# Patient Record
Sex: FEMALE
[De-identification: names, ages, dates, MRNs, and addresses within clinical notes are randomized per-mention and may not be internally consistent; named-entity substitution may affect disease eponyms.]

---

## 2018-05-01 ENCOUNTER — HOSPITAL ENCOUNTER (INPATIENT)
Dept: HOSPITAL 92 - SJJU | Age: 58
LOS: 9 days | Discharge: HOME | DRG: 470 | End: 2018-05-10
Attending: ORTHOPAEDIC SURGERY | Admitting: ORTHOPAEDIC SURGERY
Payer: COMMERCIAL

## 2018-05-01 ENCOUNTER — HOSPITAL ENCOUNTER (OUTPATIENT)
Dept: HOSPITAL 92 - LABBT | Age: 58
End: 2018-05-01
Attending: ORTHOPAEDIC SURGERY
Payer: COMMERCIAL

## 2018-05-01 VITALS — BODY MASS INDEX: 37.2 KG/M2

## 2018-05-01 DIAGNOSIS — E78.5: ICD-10-CM

## 2018-05-01 DIAGNOSIS — E66.9: ICD-10-CM

## 2018-05-01 DIAGNOSIS — M16.11: Primary | ICD-10-CM

## 2018-05-01 DIAGNOSIS — D64.9: ICD-10-CM

## 2018-05-01 DIAGNOSIS — M19.90: ICD-10-CM

## 2018-05-01 DIAGNOSIS — M17.11: ICD-10-CM

## 2018-05-01 DIAGNOSIS — Z01.818: Primary | ICD-10-CM

## 2018-05-01 LAB
ANION GAP SERPL CALC-SCNC: 13 MMOL/L (ref 10–20)
BUN SERPL-MCNC: 13 MG/DL (ref 9.8–20.1)
CALCIUM SERPL-MCNC: 9.5 MG/DL (ref 7.8–10.44)
CHLORIDE SERPL-SCNC: 107 MMOL/L (ref 98–107)
CO2 SERPL-SCNC: 26 MMOL/L (ref 22–29)
CREAT CL PREDICTED SERPL C-G-VRATE: 0 ML/MIN (ref 70–130)
CRYSTAL-AUWI FLAG: 0.1 (ref 0–15)
GLUCOSE SERPL-MCNC: 136 MG/DL (ref 70–105)
HEV IGM SER QL: 2 (ref 0–7.99)
HGB BLD-MCNC: 12.3 G/DL (ref 12–16)
HYALINE CASTS #/AREA URNS LPF: (no result) LPF
INR PPP: 1
MCH RBC QN AUTO: 29.1 PG (ref 27–31)
MCV RBC AUTO: 84.7 FL (ref 81–99)
PATHC CAST-AUWI FLAG: 0 (ref 0–2.49)
PLATELET # BLD AUTO: 207 THOU/UL (ref 130–400)
POTASSIUM SERPL-SCNC: 3.9 MMOL/L (ref 3.5–5.1)
PROTHROMBIN TIME: 13.5 SEC (ref 12–14.7)
RBC # BLD AUTO: 4.22 MILL/UL (ref 4.2–5.4)
RBC UR QL AUTO: (no result) HPF (ref 0–3)
SODIUM SERPL-SCNC: 142 MMOL/L (ref 136–145)
SP GR UR STRIP: 1.01 (ref 1–1.04)
SPERM-AUWI FLAG: 0 (ref 0–9.9)
WBC # BLD AUTO: 7.4 THOU/UL (ref 4.8–10.8)
WBC UR QL AUTO: (no result) HPF (ref 0–3)
YEAST-AUWI FLAG: 0 (ref 0–25)

## 2018-05-01 PROCEDURE — 80048 BASIC METABOLIC PNL TOTAL CA: CPT

## 2018-05-01 PROCEDURE — 87081 CULTURE SCREEN ONLY: CPT

## 2018-05-01 PROCEDURE — 36415 COLL VENOUS BLD VENIPUNCTURE: CPT

## 2018-05-01 PROCEDURE — 93010 ELECTROCARDIOGRAM REPORT: CPT

## 2018-05-01 PROCEDURE — 86900 BLOOD TYPING SEROLOGIC ABO: CPT

## 2018-05-01 PROCEDURE — 81001 URINALYSIS AUTO W/SCOPE: CPT

## 2018-05-01 PROCEDURE — 85027 COMPLETE CBC AUTOMATED: CPT

## 2018-05-01 PROCEDURE — 86901 BLOOD TYPING SEROLOGIC RH(D): CPT

## 2018-05-01 PROCEDURE — 85610 PROTHROMBIN TIME: CPT

## 2018-05-01 PROCEDURE — 93005 ELECTROCARDIOGRAM TRACING: CPT

## 2018-05-01 PROCEDURE — 86850 RBC ANTIBODY SCREEN: CPT

## 2018-05-01 NOTE — EKG
Test Reason : 

Blood Pressure : ***/*** mmHG

Vent. Rate : 074 BPM     Atrial Rate : 074 BPM

   P-R Int : 190 ms          QRS Dur : 086 ms

    QT Int : 392 ms       P-R-T Axes : 064 059 066 degrees

   QTc Int : 435 ms

 

Normal sinus rhythm

Normal ECG

No previous ECGs available

Confirmed by DR. MUSA PATTON ([id number]) on 5/1/2018 4:46:18 PM

 

Referred By:  EULALIO           Confirmed By:DR. MUSA PATTON

## 2018-05-08 PROCEDURE — 0SR904Z REPLACEMENT OF RIGHT HIP JOINT WITH CERAMIC ON POLYETHYLENE SYNTHETIC SUBSTITUTE, OPEN APPROACH: ICD-10-PCS | Performed by: ORTHOPAEDIC SURGERY

## 2018-05-08 RX ADMIN — Medication SCH GM: at 15:41

## 2018-05-08 RX ADMIN — Medication SCH GM: at 23:04

## 2018-05-08 RX ADMIN — DOCUSATE SODIUM 50 MG AND SENNOSIDES 8.6 MG SCH TAB: 8.6; 5 TABLET, FILM COATED ORAL at 20:52

## 2018-05-08 NOTE — CON
DATE OF CONSULTATION:  05/08/2018

 

PRIMARY CARE PHYSICIAN:  Dr. Valera.

 

PRIMARY ATTENDING:  Dr. Migue Kim.

 

REASON FOR ADMISSION:  Elective admission for right hip replacement.

 

HISTORY OF PRESENT ILLNESS:  A 57-year-old female who has underlying history of 
hypertension as well as osteoarthritis.  She is having right hip pain for about 
a year.  Her pain is getting worse with physical activity.  Her pain gets 
better with rest.  She tried all NSAIDs and different type of pain medication 
as well as exercise without any significant improvement.  Her right hip was 
more worse and that was affecting her quality of life and that is why finally 
patient agreed to go for right hip replacement.  Dr. Camarena admitted her for 
right hip replacement.

 

She does not have any chest pain, palpitation, shortness of breath.  She denies 
any constipation or diarrhea.  She denies any UTI symptoms.  She denies any 
nausea, vomiting, diarrhea.

 

Patient is Persian speaking only, so history obtained with help of her daughter 
who is present at bedside.

 

PAST MEDICAL HISTORY:  Hypertension.

 

PAST SURGICAL HISTORY:  Cholecystectomy and status post right hip replacement.

 

PAST PSYCHIATRIC HISTORY:  Reviewed and negative.

 

ALLERGIES:  No known drug allergy.

 

FAMILY HISTORY:  No strong family history of premature coronary artery disease, 
stroke or cancer.

 

CURRENT HOME MEDICATIONS:  Celecoxib 200 mg p.o. daily, Toradol with tramadol 10
/25 one tablet q.6 hourly p.r.n., losartan 50 mg p.o. daily, Lyrica 75 mg p.o. 
at bedtime, vitamin B complex 1 tablet p.o. daily.

 

REVIEW OF SYSTEMS:  The following complete review of systems was negative, 
unless otherwise mentioned in the HPI or below:

Constitutional:  Weight loss or gain, ability to conduct usual activities.

Skin:  Rash, itching.

Eyes:  Double vision, pain.

ENT/Mouth:  Nose bleeding, neck stiffness, pain, tenderness.

Cardiovascular:  Palpitations, dyspnea on exertion, orthopnea.

Respiratory:  Shortness of breath, wheezing, cough, hemoptysis, fever or night 
sweats.

Gastrointestinal:  Poor appetite, abdominal pain, heartburn, nausea, vomiting, 
constipation, or diarrhea.

Genitourinary:  Urgency, frequency, dysuria, nocturia.

Musculoskeletal:  Pain, swelling.

Neurologic/Psychiatric:  Anxiety, depression.

Allergy/Immunologic:  Skin rash, bleeding tendency.

 

Please see my HPI for pertinent positive and negative.  All other review of 
systems reviewed and negative except as mentioned in the HPI.

 

SOCIAL HISTORY:  Patient is  and lives at home with family.  No history 
of tobacco, alcohol or illicit drug abuse.

 

HOSPITAL COURSE:  Reviewed.

 

PHYSICAL EXAMINATION:

VITAL SIGNS:  Currently, blood pressure 120/70, pulse 72, respiratory rate 18, 
weight 210 pounds, saturation 99% on room air.

GENERAL:  The patient is currently alert, awake, in no obvious acute distress.

HEAD:  Normocephalic, atraumatic.

EYES:  Pupils round, reactive to light.  Extraocular muscle intact.

ENT:  Oropharynx within normal limits.  Moist mucous membranes.  No oral lesion
, no pharyngeal erythema, no exudate.

NECK:  Supple, no JVD, no thyromegaly, no carotid bruit, no jugular venous 
distention.

LUNGS:  Clear to auscultation without any rhonchi or rales.

CARDIAC:  S1 and S2 regular.  No murmur, no gallop, no rub.

ABDOMEN:  Soft, bowel sounds present, nontender, nondistended.  No organomegaly
, no mass.  Obesity present.  No suprapubic tenderness.

BACK:  Examination unremarkable.

EXTREMITIES:  Upper extremity passive movement of all joints are normal.  Lower 
extremity, right hip site surgical dressing noted.  Epidural in place.  SCD in 
place.  No edema.  Good peripheral pulsation, no calf tenderness.  Good distal 
pulsation.

SKIN:  No skin rash.

HEMATOLOGICAL SYSTEM:  No lymphadenopathy.

NEUROLOGIC:  Nonfocal examination.  Patient is moving all 4 limbs.  Sensation 
intact.  Reflexes symmetrical.  Plantar bilateral flexor.

PSYCHIATRIC:  Normal affect.

 

SIGNIFICANT LABORATORY DATA AND IMAGING DATA:  CBC:  WBC 7.4, hemoglobin 12.3, 
platelet 207.  INR 1.0.  BMP:  Sodium 142, potassium 3.9, chloride 107, carbon 
dioxide 26, anion gap 13, BUN 13, creatinine 0.65, glucose 136.  Hemoglobin A1c 
6.2, calcium 9.5.  LFT:  AST 15, ALT 17, alkaline phosphatase 61, albumin 4.0, 
.  Urinalysis; leukocyte esterase trace.  Hip x-ray noted.

 

ASSESSMENT AND PLAN:

1.  Status post right hip replacement.  The patient has advanced osteoarthritis 
and failed conservative therapy required right hip replacement.  Currently, 
patient had surgery done without any complications.  She has epidural in placed 
and pain is controlled.  The patient will get aspirin 81 mg p.o. b.i.d. for 
deep venous thrombosis prophylaxis.  As per protocol postoperatively, we will 
continue ferrous gluconate 324 mg p.o. b.i.d.  The patient will be given stool 
softener to prevent constipation.  The patient will be given pain medication as 
needed basis for pain control.  She will have physical therapy and occupational 
therapy as per [facility] protocol treatment.

2.  Hypertension.  We will continue losartan 50 mg p.o. daily.  If blood 
pressure is less than 100, then will hold on antihypertensive medication.

3.  History of dyslipidemia.  Based on lipid profile in 2015, patient has 
elevated LDL.  The patient is instructed to follow up with primary care 
physician and repeat outpatient fasting lipid profile and considering statin 
therapy will be required based on lipid profile testing.  We will defer testing 
to be done after discharge.

4.  Prediabetes, based on 2015, patient has hemoglobin A1c of 6.2.  The patient 
will need another testing after discharge.  Necessary patient education given 
about diet and exercise.

5.  Obesity with body mass index of 37.  Weight loss education given.  Healthy 
lifestyle measures discussed with the patient.

6.  Deep venous thrombosis prophylaxis.  Patient is already on aspirin therapy 
for deep venous thrombosis prophylaxis as per protocol.

7.  Gastrointestinal prophylaxis, Pepcid 20 mg p.o. b.i.d.

8.  Code status:  The patient is FULL CODE.  Patient's  is surrogate 
decision maker.

 

Disposition plan based on clinical course.  Patient will stay in hospital more 
than 2 midnights.

 

Thank you for the consult.  We will follow up with you while in hospital.  Plan 
of care discussed with the patient's family member at bedside.

 

GEOVANY

## 2018-05-08 NOTE — RAD
AP VIEW PELVIS

TWO VIEWS RIGHT HIP:

 

DATE: 5/8/18.

 

HISTORY:  

Post right total hip arthroplasty.

 

FINDINGS: 

There are postsurgical changes related to placement of right total hip prosthesis.  No hardware compl
ication is seen.  No fracture or dislocation is identified.  No other osseous abnormality.  Phlebolit
hs overlie the pelvis.  Subcutaneous emphysema is seen at the right lateral hip related to recent pos
tsurgical change.

 

IMPRESSION: 

Right total hip prosthesis.  No other acute osseous abnormality is seen.

 

POS: SUDHAKAR

## 2018-05-08 NOTE — OP
DATE OF PROCEDURE:  05/08/2018

 

PREOPERATIVE DIAGNOSIS:  Right hip osteoarthritis.

 

POSTOPERATIVE DIAGNOSIS:  Right hip osteoarthritis.

 

PROCEDURE PERFORMED:  Right total hip arthroplasty.

 

STAFF:  Julio Camarena M.D.

 

ASSISTANT:  Campos Mathias PA-C

 

ANESTHESIA:  Kamlesh.  The patient received a general intubation with an epidural.

 

ESTIMATED BLOOD LOSS:  150 mL.

 

TOURNIQUET TIME:  None.

 

IMPLANTS:  A Trident 15 mm cluster shell Trident 10 degree poly insert, 36 mm 
Accolade 130 degree size 2 neck stem, 36 Biolox Delta minus 2-1/2 femoral head 
ceramic femoral head.

 

ANTIBIOTICS:  Given with Ancef 2 grams, vancomycin 1.5 grams.

 

COMPLICATIONS:  None.

 

HISTORY OF PRESENT ILLNESS:  Amandeep Burgess is a 57-year-old female who 
presented to me with right hip pain as well as some radiating pain.  Pain is 
increased with ROM leading to groin and deeper hip pain.  She has pain when she 
rides very long in seated position, 8 out of 10.  Denies numbness or tingling.  
The patient does have radiating pain at posterior calf.  Exam of the right hip 
showed groin pain with internal rotation of her hip as well as neurovascularly 
intact.  She was straight leg raise equivocal. MRI of her hip show degenerative 
changes as well as x-rays showed osteophyte formation, migration of the head 
and joint space loss without acute fracture.  MRI of lumbar spine also showed 
degenerative changes at L3-L4 as well as L4-L5.  I discussed with the patient 
that a portion of his pain was coming from a right hip arthritis and a portion 
was coming from her back.  I discussed the risks and benefits of surgery to 
include pain, scar, bleeding, infection, damage to vital structures, decreased 
range of motion or strength, failure of procedure, continued pain despite 
surgical intervention, loss of life or limb.  The patient understands the risks 
and benefits and elected to proceed.

 

PROCEDURE IN DETAIL:  Time out was performed designating the patient's right 
lower extremity as the operative site based on sight, consents and markings.  
After timeout, the patient's right lower extremity was prepped and draped in 
sterile fashion.  The patient was placed in lateral decubitus position with all 
bony prominences well padded, hip position, lateral incision down through skin, 
down the large amount of subcutaneous fat to the IT band.  IT band was split.  
Gluteus medius and minimus were taken off.  The capsule was evaluated, excised 
down to the acetabulum.  We dislocated the hip, cut the femoral neck, removed 
the head was about 46 mm diameter.  We then excised all the labrum.  We then 
placed our acetabular retractors in place.  We reamed sequentially up from 44 
up to 50.  We placed a final 50 mm cup into position.  We had good firm fixation
, 5 to 6 mm  uncovered superior as well as posterior.  I liked position of the 
cup.  We then washed, placed our 10 degree poly insert in position, held in 
place, had firm stable fixation with our cup.  We then moved back to the patient
's femoral neck.  We broached.  We used a cookie cutter opening canal reamer.  
We then placed a broach and broached to a size 2.  We reduced it to size 2 
standard, felt the patient might be a little long, put a final trial and 
therefore elected for a size 2 with -2.5 ceramic head.  We reduced and had good 
stability.  Lengths seemed equal on the table.  We reduced into place, washed, 
and closed the gluteus minimus, gluteus medius with #2 Vicryl, #2 Quill, 0 Quill
, 2-0 Quill, and glue.

 

The patient will be admitted back to [facility].  We will follow in 
house.

 

GEOVANY

## 2018-05-09 LAB
HGB BLD-MCNC: 10.1 G/DL (ref 12–16)
MCH RBC QN AUTO: 28.9 PG (ref 27–31)
MCV RBC AUTO: 85.5 FL (ref 81–99)
PLATELET # BLD AUTO: 152 THOU/UL (ref 130–400)
RBC # BLD AUTO: 3.48 MILL/UL (ref 4.2–5.4)
WBC # BLD AUTO: 7.7 THOU/UL (ref 4.8–10.8)

## 2018-05-09 RX ADMIN — MULTIPLE VITAMINS W/ MINERALS TAB SCH TAB: TAB at 08:10

## 2018-05-09 RX ADMIN — DOCUSATE SODIUM 50 MG AND SENNOSIDES 8.6 MG SCH TAB: 8.6; 5 TABLET, FILM COATED ORAL at 08:07

## 2018-05-09 RX ADMIN — Medication SCH TAB: at 08:11

## 2018-05-09 RX ADMIN — DOCUSATE SODIUM 50 MG AND SENNOSIDES 8.6 MG SCH TAB: 8.6; 5 TABLET, FILM COATED ORAL at 20:09

## 2018-05-09 NOTE — PDOC.PN
- Subjective


Encounter Start Date: 05/09/18


Encounter Start Time: 08:30





Patient seen and examined for medical management. No new complaints. No 

overnight events





overall doing well, her pain is controlled





- Objective


Resuscitation Status: 


 











Resuscitation Status           FULL:Full Resuscitation














MAR Reviewed: Yes


Vital Signs & Weight: 


 Vital Signs (12 hours)











  Temp Pulse Resp BP BP Pulse Ox


 


 05/09/18 08:26  99.1 F  93  16   118/68  95


 


 05/09/18 04:38  100.1 F H  95  17  110/66   98


 


 05/08/18 23:04  99.5 F  87  16  113/68   99








 Weight











Weight                         210 lb














I&O: 


 











 05/08/18 05/09/18 05/10/18





 06:59 06:59 06:59


 


Intake Total  3625 


 


Output Total  2100 


 


Balance  1525 











Result Diagrams: 


 05/09/18 05:33








Phys Exam





- Physical Examination


Constitutional: NAD


HEENT: PERRLA, moist MMs, sclera anicteric


Neck: no nodes, no JVD, supple, full ROM


Respiratory: no wheezing, no rales, no rhonchi, clear to auscultation bilateral


Cardiovascular: RRR, no significant murmur, no rub


Gastrointestinal: soft, non-tender, no distention, positive bowel sounds


obesity+


Musculoskeletal: no edema, pulses present


epidrual in place, surgical site with dressing


Neurological: non-focal, normal sensation, moves all 4 limbs


Lymphatic: no nodes


Psychiatric: normal affect, A&O x 3


Skin: no rash, normal turgor





Dx/Plan


(1) Status post total hip replacement, right


Code(s): Z96.641 - PRESENCE OF RIGHT ARTIFICIAL HIP JOINT   Status: Acute   





(2) Anemia, normocytic normochromic


Code(s): D64.9 - ANEMIA, UNSPECIFIED   Status: Chronic   





(3) Dyslipidemia


Code(s): E78.5 - HYPERLIPIDEMIA, UNSPECIFIED   Status: Chronic   





(4) Hypertension


Code(s): I10 - ESSENTIAL (PRIMARY) HYPERTENSION   Status: Chronic   





(5) Obesity (BMI 30-39.9)


Code(s): E66.9 - OBESITY, UNSPECIFIED   Status: Chronic   





(6) Osteoarthritis


Code(s): M19.90 - UNSPECIFIED OSTEOARTHRITIS, UNSPECIFIED SITE   Status: 

Chronic   





- Plan


cont current plan of care, plan discussed w/ family, PT/OT, DVT proph w/SCDs





* pt is on aspirin for DVT prophylaxis as per [facility] protocol


* continue PT/OT as per [facility] protocol


* pain control with pain meds  as below


* epidural as per anesthesia


* medication reviewed as below


* symptomatic treatment


* continue pepcid for GI prophylaxis.


* hold blood pressure meds for SBP <120 today


* will monitor for fever today, likely reactive fever after surgery











Review of Systems





- Review of Systems


Constitutional: fever.  negative: chills, sweats, weakness, malaise, other


Eyes: negative: Pain, Vision Change, Conjunctivae Inflammation, Eyelid 

Inflammation, Redness, Other


ENT: negative: Ear Pain, Ear Discharge, Nose Pain, Nose Discharge, Nose 

Congestion, Mouth Pain, Mouth Swelling, Throat Pain, Throat Swelling, Other


Respiratory: negative: Cough, Dry, Shortness of Breath, Hemoptysis, SOB with 

Excertion, Pleuritic Pain, Sputum, Wheezing


Cardiovascular: negative: chest pain, palpitations, orthopnea, paroxysmal 

nocturnal dyspnea, edema, light headedness, other


Gastrointestinal: negative: Nausea, Vomiting, Abdominal Pain, Diarrhea, 

Constipation, Melena, Hematochezia, Other


Genitourinary: negative: Dysuria, Frequency, Incontinence, Hematuria, Retention

, Other


Musculoskeletal: negative: Neck Pain, Shoulder Pain, Arm Pain, Back Pain, Hand 

Pain, Leg Pain, Foot Pain, Other


Skin: negative: Rash, Lesions, Moody, Bruising, Other


Neurological: negative: Weakness, Numbness, Incoordination, Change in Speech, 

Confusion, Seizures, Other





- Medications/Allergies


Allergies/Adverse Reactions: 


 Allergies











Allergy/AdvReac Type Severity Reaction Status Date / Time


 


No Known Allergies Allergy   Unverified 05/01/18 14:27











Medications: 


 Current Medications





Acetaminophen (Tylenol)  650 mg PO Q4H PRN


   PRN Reason: HA/ T > 101F; Mild Pain (1-3)


   Last Admin: 05/09/18 03:14 Dose:  650 mg


Hydrocodone Bitart/Acetaminophen (Norco 5/325)  1 tab PO Q4H PRN


   PRN Reason: Mild  Pain 0-3


Hydrocodone Bitart/Acetaminophen (Norco 5/325)  2 tab PO Q4H PRN


   PRN Reason: For Moderate  Pain 4-6


Hydrocodone Bitart/Acetaminophen (Norco 10/325)  1 tab PO Q4H PRN


   PRN Reason: Moderate Pain (4-6)


Hydrocodone Bitart/Acetaminophen (Norco 10/325)  2 tab PO Q4H PRN


   PRN Reason: Severe Pain (7-10)


Al Hydroxide/Mg Hydroxide (Maalox)  15 ml PO Q4H PRN


   PRN Reason: Heartburn  or Indigestion


Artificial Tears (Tears Naturale)  0 drop EA EYE PRN PRN


   PRN Reason: Dry Eyes


Aspirin (Aspirin Chewable)  81 mg PO BID [facility]


   Last Admin: 05/09/18 08:10 Dose:  81 mg


Celecoxib (Celebrex)  200 mg PO DAILY [facility]


   Last Admin: 05/09/18 08:10 Dose:  200 mg


Diphenhydramine HCl (Benadryl)  25 mg IM Q3H PRN


   PRN Reason: Itching


Diphenhydramine HCl (Benadryl)  25 mg IVP Q3H PRN


   PRN Reason: Itching


Diphenhydramine HCl (Benadryl)  25 mg PO Q6H PRN


   PRN Reason: Itching


Emollient Cream (Hydrocerin Cream)  0 gm TOP PRN PRN


   PRN Reason: Itching


Famotidine (Pepcid)  20 mg PO BID [facility]


   Last Admin: 05/09/18 08:10 Dose:  20 mg


Fentanyl (Sublimaze)  50 mcg SLOW IVP Q30MIN PRN


   PRN Reason: Moderate Pain (4-6)


Fentanyl (Sublimaze)  100 mcg SLOW IVP Q1H PRN


   PRN Reason: Severe Pain (7-10)


Ferrous Gluconate (Fergon)  324 mg PO BID [facility]


   Last Admin: 05/09/18 08:09 Dose:  324 mg


Guaifenesin (Robitussin Sf)  200 mg PO Q4H PRN


   PRN Reason: Cough


Hydralazine HCl (Apresoline)  10 mg SLOW IVP Q4H PRN


   PRN Reason: Systolic BP > 180


Fentanyl Citrate 1,250 mcg/Bupivacaine HCl 25 ml/ Sodium Chloride  250 mls @ 0 

mls/hr EPIDURAL INF [facility]


   PRN Reason: As Directed


Dextrose/Sodium Chloride (D5 1/2 Ns)  1,000 mls @ 100 mls/hr IV .Q10H [facility]


   Last Admin: 05/09/18 06:52 Dose:  Not Given


Iron/Minerals/Multivitamins (Theragran M)  1 tab PO DAILY [facility]


   Last Admin: 05/09/18 08:10 Dose:  1 tab


Ketorolac Tromethamine (Toradol)  30 mg IVP Q6H PRN


   PRN Reason: Moderate Pain (4-6)


   Stop: 05/11/18 07:16


Labetalol HCl (Normodyne)  20 mg SLOW IVP Q4H PRN


   PRN Reason: Systolic BP > 180


Loperamide HCl (Imodium)  2 mg PO PRN PRN


   PRN Reason: Diarrhea/Loose Stools


Losartan Potassium (Cozaar)  50 mg PO DAILY [facility]


Magnesium Hydroxide (Milk Of Magnesium)  30 ml PO DAILYPRN PRN


   PRN Reason: Constipation


Mineral Oil/White Petrolatum (Eucerin Cream)  0 gm TOP BIDPRN PRN


   PRN Reason: Dry Skin


Miscellaneous Information (Communication Order-Pharmacy)  1 each FS ASDIR [facility]


Multivitamins/Zinc (Stress 600 With Zinc)  1 tab PO DAILY [facility]


   Last Admin: 05/09/18 08:11 Dose:  1 tab


Naloxone HCl (Narcan)  0.2 mg IV Q5MIN PRN


   PRN Reason: RR <=8 OR OBTUNDED/UNAROUSABLE


Naloxone HCl (Narcan)  0.1 mg IVP Q15MIN PRN


   PRN Reason: URINARY RETENTION


Ondansetron HCl (Zofran)  4 mg IVP Q6H PRN


   PRN Reason: Nausea/Vomiting


Phenol (Chloraseptic Spray 180 Ml Bot)  0 ml PO PRN PRN


   PRN Reason: Sore Throat


Pregabalin (Lyrica)  75 mg PO HS [facility]


   Last Admin: 05/08/18 20:52 Dose:  Not Given


Promethazine HCl (Phenergan Suppository)  25 mg ID Q4H PRN


   PRN Reason: Nausea/Vomiting


Promethazine HCl (Phenergan)  12.5 mg IM Q4H PRN


   PRN Reason: Nausea/Vomiting


   Last Admin: 05/08/18 21:02 Dose:  12.5 mg


Senna (Senokot)  2 tab PO HSPRN PRN


   PRN Reason: Constipation


Senna/Docusate Sodium (Senokot S)  2 tab PO BID [facility]


   Last Admin: 05/09/18 08:07 Dose:  2 tab


Sodium Chloride (Flush - Normal Saline)  10 ml IVF PRN PRN


   PRN Reason: Saline Flush


Tramadol HCl (Ultram)  50 mg PO Q6H PRN


   PRN Reason: Mild Pain 1-3


Tramadol HCl (Ultram)  100 mg PO Q6H PRN


   PRN Reason: Mild Pain (1-3)


Zolpidem Tartrate (Ambien)  5 mg PO HSPRN PRN


   PRN Reason: Insomnia

## 2018-05-10 VITALS — SYSTOLIC BLOOD PRESSURE: 95 MMHG | DIASTOLIC BLOOD PRESSURE: 62 MMHG

## 2018-05-10 VITALS — TEMPERATURE: 99 F

## 2018-05-10 LAB
HGB BLD-MCNC: 10.6 G/DL (ref 12–16)
MCH RBC QN AUTO: 28.9 PG (ref 27–31)
MCV RBC AUTO: 86.7 FL (ref 81–99)
PLATELET # BLD AUTO: 152 THOU/UL (ref 130–400)
RBC # BLD AUTO: 3.68 MILL/UL (ref 4.2–5.4)
WBC # BLD AUTO: 9.8 THOU/UL (ref 4.8–10.8)

## 2018-05-10 RX ADMIN — Medication SCH TAB: at 09:13

## 2018-05-10 RX ADMIN — MULTIPLE VITAMINS W/ MINERALS TAB SCH TAB: TAB at 09:12

## 2018-05-10 RX ADMIN — DOCUSATE SODIUM 50 MG AND SENNOSIDES 8.6 MG SCH TAB: 8.6; 5 TABLET, FILM COATED ORAL at 09:12

## 2018-05-10 NOTE — DIS
DATE OF ADMISSION:  05/08/2018

 

DATE OF DISCHARGE:  05/10/2018

 

PRIMARY CARE PHYSICIAN:  Dr. Valera.

 

DISCHARGE DISPOSITION:  Home with outpatient physical therapy.

 

PRIMARY DISCHARGE DIAGNOSIS:  Status post right total hip replacement.

 

SECONDARY DISCHARGE DIAGNOSES:  Osteoarthritis, obesity with BMI of 30, hypertension, dyslipidemia, a
nemia, normocytic, normochromic.

 

PRIMARY PROCEDURE/OPERATION:  Right total hip replacement by Dr. Camarena.

 

RADIOLOGICAL INVESTIGATION:  Hip x-ray.

 

SIGNIFICANT LABORATORY DATA:  WBC 9.8, hemoglobin 10.6, platelet 152.

 

DISCHARGE MEDICATIONS:  Aspirin 81 mg p.o. b.i.d., Norco 10 one or two tablets q.4 hourly p.r.n., ester
ecoxib 200 mg p.o. daily, Toradol with tramadol 10/25 p.r.n., losartan 50 mg p.o. daily, Lyrica 75 mg
 p.o. at bedtime, vitamin B complex 1 tablet p.o. daily.

 

CONTRAINDICATIONS:  None.

 

CODE STATUS:  FULL CODE.

 

INPATIENT CONSULTANT:  Dr. Camarena was primary [facility] team was consulted for medical comanagement.

 

TEST RESULTS PENDING ON DISCHARGE:  None.

 

ALLERGIES:  No known drug allergy.

 

DISCHARGE PLAN:  Post hospital, patient will follow up with Dr. Camarena on 05/21/2018.  The patient w
ill make appointment with primary care physician in 1 week.

 

HOSPITAL COURSE:  A 57-year-old female with the above-mentioned medical problem who was electively ad
mitted by Dr. Migue Kim for right total hip replacement which was done on 05/08/2018 without any
 complication.  Postoperatively, a [facility] Team was consulted for medical comanagement
.  Patient medical problem remained stable.  We continued all her home medication while in hospital. 
 She was given aspirin for DVT prophylaxis.  She had Pepcid for gastrointestinal prophylaxis and she 
had epidural while in hospital.  Her pain was controlled with pain medication.  Patient did very well
 with PT, OT as per [facility] protocol.  The patient is currently planned for discharge today 
by primary team.

 

The patient is seen and examined at bedside today.  Yesterday, patient had fever, but she does not ha
ve any UTI symptoms.  She does not have any leukocytosis, so we are suspecting this is reactionary fe
doug while in hospital and patient did not require any antibiotic therapy.  The patient is instructed 
to follow with primary care physician if persistent fever.

 

All review of system reviewed with her and negative.  Currently, the patient is seen and examined at 
bedside today.

 

PHYSICAL EXAMINATION:

VITAL SIGNS:  Currently, temperature 99.0, pulse 81, respiratory rate 16, saturation 95% on room air,
 blood pressure 116/74, and weight 210 pounds.

GENERAL:  The patient is currently alert, awake, no acute distress.

HEAD:  Normocephalic, atraumatic.

EYES:  Pupils round and reactive to light.  Extraocular muscle intact.

ENT:  Oropharynx within normal limits.  Moist mucous membranes.

NECK:  Supple, no JVD, no thyromegaly, no carotid bruit.

LUNGS:  Clear to auscultation without any rhonchi or rales.

CARDIAC:  S1, S2 regular without any murmur.

ABDOMEN:  Soft and benign.

EXTREMITIES:  No edema.

NEUROLOGIC:  Nonfocal examination.

 

Overall, patient is medically stable for discharge today and we will sign off.

## 2018-05-10 NOTE — PDOC.PN
- Subjective


Encounter Start Date: 05/10/18


Encounter Start Time: 09:40





Patient seen and examined for medical management. No new complaints. No 

overnight events





- Objective


Resuscitation Status: 


 











Resuscitation Status           FULL:Full Resuscitation














MAR Reviewed: Yes


Vital Signs & Weight: 


 Vital Signs (12 hours)











  Temp Pulse Resp BP Pulse Ox


 


 05/10/18 07:25  99 F  81  16  116/74  95


 


 05/10/18 04:35  99.2 F  77  20  98/64  99


 


 05/10/18 00:00  99.5 F  89  16  113/69  96








 Weight











Admit Weight                   210 lb


 


Weight                         210 lb














I&O: 


 











 05/09/18 05/10/18 05/11/18





 06:59 06:59 06:59


 


Intake Total 3625 985 390


 


Output Total 2100 1100 700


 


Balance 1525 -115 -310











Result Diagrams: 


 05/10/18 05:03








Phys Exam





- Physical Examination


Constitutional: NAD


HEENT: PERRLA, moist MMs, sclera anicteric


Neck: no JVD, supple


Respiratory: no wheezing, no rales, no rhonchi


Cardiovascular: RRR, no significant murmur, no rub


Gastrointestinal: soft, non-tender, no distention, positive bowel sounds


Musculoskeletal: no edema, pulses present


Neurological: non-focal, normal sensation, moves all 4 limbs


Lymphatic: no nodes


Psychiatric: normal affect, A&O x 3


Skin: no rash, normal turgor





Dx/Plan


(1) Status post total hip replacement, right


Code(s): Z96.641 - PRESENCE OF RIGHT ARTIFICIAL HIP JOINT   Status: Acute   





(2) Anemia, normocytic normochromic


Code(s): D64.9 - ANEMIA, UNSPECIFIED   Status: Chronic   





(3) Dyslipidemia


Code(s): E78.5 - HYPERLIPIDEMIA, UNSPECIFIED   Status: Chronic   





(4) Hypertension


Code(s): I10 - ESSENTIAL (PRIMARY) HYPERTENSION   Status: Chronic   





(5) Obesity (BMI 30-39.9)


Code(s): E66.9 - OBESITY, UNSPECIFIED   Status: Chronic   





(6) Osteoarthritis


Code(s): M19.90 - UNSPECIFIED OSTEOARTHRITIS, UNSPECIFIED SITE   Status: 

Chronic   





- Plan


cont current plan of care, plan discussed w/ family, PT/OT, 





* pt is currently on aspirin for DVT prophylaxis as per [facility] protocol


* continue PT/OT as per [facility] protocol for now


* outpt PT after discharge


* pain controlled with pain meds  as below


* epidural will be removed today as per nesthesia


* medication reviewed as below


* symptomatic treatment


* continue pepcid for GI prophylaxis.


* pt is planned for discharge today


* resume home medication on discharge


* stable for discharge medically


* pain meds on discharge as per primary team


* see my discharge summery.








Review of Systems





- Review of Systems


Eyes: negative: Pain, Vision Change, Conjunctivae Inflammation, Eyelid 

Inflammation, Redness, Other


ENT: negative: Ear Pain, Ear Discharge, Nose Pain, Nose Discharge, Nose 

Congestion, Mouth Pain, Mouth Swelling, Throat Pain, Throat Swelling, Other


Respiratory: negative: Cough, Dry, Shortness of Breath, Hemoptysis, SOB with 

Excertion, Pleuritic Pain, Sputum, Wheezing


Cardiovascular: negative: chest pain, palpitations, orthopnea, paroxysmal 

nocturnal dyspnea, edema, light headedness, other


Gastrointestinal: negative: Nausea, Vomiting, Abdominal Pain, Diarrhea, 

Constipation, Melena, Hematochezia, Other


Genitourinary: negative: Dysuria, Frequency, Incontinence, Hematuria, Retention

, Other


Musculoskeletal: negative: Neck Pain, Shoulder Pain, Arm Pain, Back Pain, Hand 

Pain, Leg Pain, Foot Pain, Other


Skin: negative: Rash, Lesions, Moody, Bruising, Other





- Medications/Allergies


Allergies/Adverse Reactions: 


 Allergies











Allergy/AdvReac Type Severity Reaction Status Date / Time


 


No Known Allergies Allergy   Unverified 05/01/18 14:27











Medications: 


 Current Medications





Acetaminophen (Tylenol)  650 mg PO Q4H PRN


   PRN Reason: HA/ T > 101F; Mild Pain (1-3)


   Last Admin: 05/09/18 20:08 Dose:  650 mg


Hydrocodone Bitart/Acetaminophen (Norco 5/325)  1 tab PO Q4H PRN


   PRN Reason: Mild  Pain 0-3


Hydrocodone Bitart/Acetaminophen (Norco 5/325)  2 tab PO Q4H PRN


   PRN Reason: For Moderate  Pain 4-6


Hydrocodone Bitart/Acetaminophen (Norco 10/325)  1 tab PO Q4H PRN


   PRN Reason: Moderate Pain (4-6)


Hydrocodone Bitart/Acetaminophen (Norco 10/325)  2 tab PO Q4H PRN


   PRN Reason: Severe Pain (7-10)


Al Hydroxide/Mg Hydroxide (Maalox)  15 ml PO Q4H PRN


   PRN Reason: Heartburn  or Indigestion


Artificial Tears (Tears Naturale)  0 drop EA EYE PRN PRN


   PRN Reason: Dry Eyes


Aspirin (Aspirin Chewable)  81 mg PO BID [facility]


   Last Admin: 05/10/18 09:12 Dose:  81 mg


Celecoxib (Celebrex)  200 mg PO DAILY [facility]


   Last Admin: 05/10/18 09:12 Dose:  200 mg


Diphenhydramine HCl (Benadryl)  25 mg IM Q3H PRN


   PRN Reason: Itching


Diphenhydramine HCl (Benadryl)  25 mg IVP Q3H PRN


   PRN Reason: Itching


Diphenhydramine HCl (Benadryl)  25 mg PO Q6H PRN


   PRN Reason: Itching


Emollient Cream (Hydrocerin Cream)  0 gm TOP PRN PRN


   PRN Reason: Itching


Famotidine (Pepcid)  20 mg PO BID [facility]


   Last Admin: 05/10/18 09:11 Dose:  20 mg


Fentanyl (Sublimaze)  50 mcg SLOW IVP Q30MIN PRN


   PRN Reason: Moderate Pain (4-6)


Fentanyl (Sublimaze)  100 mcg SLOW IVP Q1H PRN


   PRN Reason: Severe Pain (7-10)


Ferrous Gluconate (Fergon)  324 mg PO BID [facility]


   Last Admin: 05/10/18 09:12 Dose:  324 mg


Guaifenesin (Robitussin Sf)  200 mg PO Q4H PRN


   PRN Reason: Cough


Hydralazine HCl (Apresoline)  10 mg SLOW IVP Q4H PRN


   PRN Reason: Systolic BP > 180


Fentanyl Citrate 1,250 mcg/Bupivacaine HCl 25 ml/ Sodium Chloride  250 mls @ 0 

mls/hr EPIDURAL INF [facility]


   PRN Reason: As Directed


   Last Admin: 05/10/18 04:32 Dose:  250 mls


Dextrose/Sodium Chloride (D5 1/2 Ns)  1,000 mls @ 100 mls/hr IV .Q10H [facility]


   Last Admin: 05/09/18 23:26 Dose:  Not Given


Iron/Minerals/Multivitamins (Theragran M)  1 tab PO DAILY [facility]


   Last Admin: 05/10/18 09:12 Dose:  1 tab


Ketorolac Tromethamine (Toradol)  30 mg IVP Q6H PRN


   PRN Reason: Moderate Pain (4-6)


   Stop: 05/11/18 07:16


Labetalol HCl (Normodyne)  20 mg SLOW IVP Q4H PRN


   PRN Reason: Systolic BP > 180


Loperamide HCl (Imodium)  2 mg PO PRN PRN


   PRN Reason: Diarrhea/Loose Stools


Losartan Potassium (Cozaar)  50 mg PO DAILY [facility]


   Last Admin: 05/10/18 09:11 Dose:  50 mg


Magnesium Hydroxide (Milk Of Magnesium)  30 ml PO DAILYPRN PRN


   PRN Reason: Constipation


Mineral Oil/White Petrolatum (Eucerin Cream)  0 gm TOP BIDPRN PRN


   PRN Reason: Dry Skin


Miscellaneous Information (Communication Order-Pharmacy)  1 each FS ASDIR [facility]


Multivitamins/Zinc (Stress 600 With Zinc)  1 tab PO DAILY [facility]


   Last Admin: 05/10/18 09:13 Dose:  1 tab


Naloxone HCl (Narcan)  0.2 mg IV Q5MIN PRN


   PRN Reason: RR <=8 OR OBTUNDED/UNAROUSABLE


Naloxone HCl (Narcan)  0.1 mg IVP Q15MIN PRN


   PRN Reason: URINARY RETENTION


Ondansetron HCl (Zofran)  4 mg IVP Q6H PRN


   PRN Reason: Nausea/Vomiting


Phenol (Chloraseptic Spray 180 Ml Bot)  0 ml PO PRN PRN


   PRN Reason: Sore Throat


Pregabalin (Lyrica)  75 mg PO [facility]


   Last Admin: 05/09/18 20:08 Dose:  75 mg


Promethazine HCl (Phenergan Suppository)  25 mg MD Q4H PRN


   PRN Reason: Nausea/Vomiting


Promethazine HCl (Phenergan)  12.5 mg IM Q4H PRN


   PRN Reason: Nausea/Vomiting


   Last Admin: 05/08/18 21:02 Dose:  12.5 mg


Senna (Senokot)  2 tab PO HSPRN PRN


   PRN Reason: Constipation


Senna/Docusate Sodium (Senokot S)  2 tab PO BID [facility]


   Last Admin: 05/10/18 09:12 Dose:  2 tab


Sodium Chloride (Flush - Normal Saline)  10 ml IVF PRN PRN


   PRN Reason: Saline Flush


Tramadol HCl (Ultram)  50 mg PO Q6H PRN


   PRN Reason: Mild Pain 1-3


Tramadol HCl (Ultram)  100 mg PO Q6H PRN


   PRN Reason: Mild Pain (1-3)


Zolpidem Tartrate (Ambien)  5 mg PO HSPRN PRN


   PRN Reason: Insomnia